# Patient Record
Sex: FEMALE | Race: WHITE
[De-identification: names, ages, dates, MRNs, and addresses within clinical notes are randomized per-mention and may not be internally consistent; named-entity substitution may affect disease eponyms.]

---

## 2018-04-19 ENCOUNTER — HOSPITAL ENCOUNTER (OUTPATIENT)
Dept: HOSPITAL 17 - HSDC | Age: 58
Discharge: HOME | End: 2018-04-19
Attending: NEUROLOGICAL SURGERY
Payer: COMMERCIAL

## 2018-04-19 VITALS
TEMPERATURE: 98 F | DIASTOLIC BLOOD PRESSURE: 79 MMHG | HEART RATE: 72 BPM | OXYGEN SATURATION: 97 % | SYSTOLIC BLOOD PRESSURE: 132 MMHG | RESPIRATION RATE: 18 BRPM

## 2018-04-19 VITALS — HEIGHT: 66 IN | WEIGHT: 136.69 LBS | BODY MASS INDEX: 21.97 KG/M2

## 2018-04-19 DIAGNOSIS — M51.16: Primary | ICD-10-CM

## 2018-04-19 DIAGNOSIS — I10: ICD-10-CM

## 2018-04-19 PROCEDURE — 63030 LAMOT DCMPRN NRV RT 1 LMBR: CPT

## 2018-04-19 PROCEDURE — 00630 ANES PX LUMBAR REGION NOS: CPT

## 2018-04-19 PROCEDURE — C9290 INJ, BUPIVACAINE LIPOSOME: HCPCS

## 2018-04-19 PROCEDURE — 93005 ELECTROCARDIOGRAM TRACING: CPT

## 2018-04-19 PROCEDURE — 72020 X-RAY EXAM OF SPINE 1 VIEW: CPT

## 2018-04-19 PROCEDURE — 76000 FLUOROSCOPY <1 HR PHYS/QHP: CPT

## 2018-04-19 NOTE — EKG
Date Performed: 04/19/2018       Time Performed: 07:41:38

 

PTAGE:      57 years

 

EKG:      Sinus rhythm 

 

 NORMAL ECG 

 

NO PREVIOUS TRACING            

 

DOCTOR:   Trevor Vallejo  Interpretating Date/Time  04/19/2018 13:25:02

## 2018-04-19 NOTE — RADRPT
EXAM DATE/TIME:  04/19/2018 09:35 

 

[facility] COMPARISON:     

No previous studies available for comparison.

 

                     

INDICATIONS :     

Lumbar pain, L5-S1 laminectomy, micro diskectomy.

                     

 

MEDICAL HISTORY :     

None.          

 

SURGICAL HISTORY :     

None.   

 

ENCOUNTER:     

Initial                                        

 

ACUITY:     

1 day      

 

PAIN SCORE:     

Non-responsive.

 

LOCATION:       

Lumbar spine.

 

 

CONCLUSION:     

Fluoroscopic images obtained and demonstrates temporary probe seen just posterior to the lower aspect
 of L5 endplate.

 

 

 

 Ruddy Mujica MD on April 19, 2018 at 12:00           

Board Certified Radiologist.

 This report was verified electronically.

## 2018-04-19 NOTE — PD.OP
__________________________________________________





Operative Report


Date of Surgery:  Apr 19, 2018


Preoperative Diagnosis:  


Right L5-S1 herniated nucleus pulposis


Right L5 radiculopathy


Postoperative Diagnosis:  


Right L5-S1 herniated nucleus pulposis


Right L5 radiculopathy


Procedure:


Right L5 laminotomy, resection sequestered herniated nucleus pulposus


Anesthesia:


General


Surgeon:


Jerry Albrecht


Assistant(s):


Ligia Colunga


Operation and Findings:


Findings: Partially desiccated large sequestered herniated disc fragment at the 

mid to medial inferior right L5-S1 neural foramen impinging on the exiting 

right L5 nerve root.


Positive annular tear at the junction of the right L5-S1 annulus and posterior 

inferior L5 vertebral body.





Procedure in detail:





The patient was brought into the operating room and general endotracheal 

anesthesia induced without difficulty.





Knee-high sequential compression devices were placed.





Lines were established by Anesthesia





The patient was placed in prone position on the concentric [place] table with 

the side bolsters and all extremities appropriately padded





Appropriate timeout procedure was performed with all personal present and in 

agreement





The lumbar region was shaved with clippers and sterilely prepped and draped.





1% Xylocaine with epinephrine was used for local infiltration over the incision 

site which was made just to the right of midline at the L5-S1 level.





The incision was carried sharply down to the lumbodorsal fascia which was 

incised just adjacent to the spinous processes.  Yarbrough elevator was used for 

subperiosteal elevation of paraspinous musculature and fascia away from the 

lamina and spinous process of L5 on the right side.





The deep self-retaining retractor was placed.


The appropriate level was verified with intraoperative C-arm.


The microscope was moved into place and used for the remainder of the procedure 

including the closure.





The TPS drill with the 5 mm bone bur and 2 mm Kerrison rongeur was used to 

perform a laminotomy at the right L5 lamina, taking great care to preserve the 

pars intra-articularis and facet integrity.


The ligamentum flavum was elevated away from the thecal sac and resected with 

the Kerrison rongeur which was also used to further remove ligamentum along the 

lateral recess.





The exiting L5 and S1 nerve roots were traced down to the medial aspect of the 

L5 and S1 pedicle respectively  and traced back to the exit from the thecal sac.


The thecal sac and exiting nerve root were then retracted gently medially 

revealing the underlying   disc and annulus.





There is noted to be a tear in the annulus at the right L5-S1 level at the 

junction with the posterior inferior L5 vertebral body.





This incision was carried out into the posterior longitudinal ligament towards 

the medial right L5-S1 foramen using a 11 blade knife.





The short and long blunt nerve hooks were used to probe into the right L5-S1 

foramen, and several large fragments of partially desiccated needed nucleus 

pulposus were removed with the micro-biopsy forceps.


The micropituitary forceps were then used to remove any loose fragments of disc 

material at the L5-S1 interspace, working through the existing annular tear.


Very careful check was made in the interspace as well as along the right L5-S1 

neural foramen and lateral recess to make sure that all  fragments of loose or 

herniated disc material were removed.


The neural structures appeared well decompressed at the end of the procedure.





Bleeding was carefully controlled with bone wax for the bone bleeding and 

bipolar forceps.  There is no significant bleeding time of closure.





No cerebrospinal fluid leakage was encountered.


20 cc of Exparel was injected into the paraspinous musculature at the incision 

site.


The closure was performed with 0 Vicryl interrupted for the deep and 

superficial fascia, with 3-0 Vicryl interrupted for the subcutaneous closure, 

and 4-0 Vicryl running for the subcuticular closure.


The dressing of sterile Mastisol, Steri-Strips, and antimicrobial dressing was 

applied.





The patient was taken to recovery room in stable condition.  All counts were 

correct at the end of the case.  No specimen was sent to pathology.


Estimated blood loss was 30 cc  .











Jerry Albrecht MD Apr 19, 2018 11:52